# Patient Record
(demographics unavailable — no encounter records)

---

## 2018-05-10 NOTE — CR
EXAMINATION: Two-view chest (PA and Lateral views).

 

HISTORY: Cough.

 

FINDINGS: 

The trachea is midline. The cardiomediastinal silhouette is within normal limits. There is consolidat
ion within the right upper lobe. No pleural effusion or pneumothorax.

 

Osseous structures appear unremarkable.

 

IMPRESSION: 

Right upper lobe pneumonia.

## 2018-05-10 NOTE — EDM.PDOC
ED HPI GENERAL MEDICAL PROBLEM





- General


Chief Complaint: Respiratory Problem


Stated Complaint: PNEUMONIA


Time Seen by Provider: 05/10/18 12:38


Source of Information: Reports: Patient


History Limitations: Reports: No Limitations





- History of Present Illness


INITIAL COMMENTS - FREE TEXT/NARRATIVE: 


HISTORY AND PHYSICAL:





History of present illness:


Patient is a 54-year-old female who presents to the emergency room with 

concerns of cough and fever. She states that she has been taking Augmentin 

twice daily. She recently had a right sided bicep tendon repair by Dr Sandoval 

in [place]. She states that the Augmentin was prescribed for her symptoms and 

they wanted to keep her on this medication as they felt any other antibiotic 

would interrupt her healing.


States she has been using Tylenol to manage her fever of 101-102. Concerned as 

her fever "keeps coming back". 


Denies any wrist pain, shortness of breath, abdominal pain, nausea, vomiting or 

diarrhea/constipation. Denies any recent falls or injuries.





Review of systems: 


As per history of present illness and below otherwise all systems reviewed and 

negative.





Past medical history: 


As per history of present illness and as reviewed below otherwise 

noncontributory.





Surgical history: 


As per history of present illness and as reviewed below otherwise 

noncontributory.





Social history: 


No reported history of drug or alcohol abuse.





Family history: 


As per history of present illness and as reviewed below otherwise 

noncontributory.





Physical exam:


General: Well-developed and well-nourished 54-year-old female. Alert and 

oriented. Nontoxic appearing and in no acute distress.


HEENT: Atraumatic, normocephalic, pupils equal and reactive bilaterally, 

negative for conjunctival pallor or scleral icterus, mucous membranes moist, 

throat clear, neck supple, nontender, trachea midline. No drooling or trismus 

noted. No meningeal signs


Lungs: Clear to auscultation, breath sounds equal bilaterally, chest nontender.


Heart: S1S2, regular rate and rhythm without overt murmur


Abdomen: Soft, nondistended, nontender. Negative for masses or 

hepatosplenomegaly. Negative for costovertebral tenderness.


Pelvis: Stable nontender.


Genitourinary: Deferred.


Rectal: Deferred.


Skin: Intact, warm, dry. No lesions or rashes noted.


Extremities: Right arm in sling due to recent surgery (unable to assess ROM) 

strong radial pulses bilaterally, moves all other extremities without 

difficulty or deficits, negative for cords or calf pain. Neurovascular 

unremarkable.


Neuro: Awake, alert, oriented. Cranial nerves II through XII unremarkable. 

Cerebellum unremarkable. Motor and sensory unremarkable throughout. Exam 

nonfocal.





Notes:


I will get a CBC and a CMP as I want to make sure there is no other underlying 

reason for her fevers. 


Chest x-ray shows a right upper lobe pneumonia. Her oxygen saturation is above 

95% and she is currently afebrile. Has no elevation in her white count. This 

information was shared with the patient and she is comfortable being discharged 

to home. Patient continue her Augmentin. Z-Anthony has been added. Prescription for 

Ventolin inhaler with spacer. Phenergan with codiene for night time use (120ml, 

no refills). She voices understanding and is agreeable to plan of care. She 

denies any further questions at this time.





Diagnostics:


CBC, CMP, CXR





Therapeutics:


[]





Impression: 


Right upper lobe pneumonia





Plan:


1. Continue your Augmentin until medication is completed. Azithromycin has been 

added to your regiment.


2. Ventolin inhaler may be used for coughing, one to 2 puffs every 4 hours as 

needed. Use your spacer.


3. Continue with Tylenol and ibuprofen for fever management. Phenergan with 

Codiene for nighttime use; will cause drowsiness - so do not take while driving 

or needing to be functioning outside the house.


4. Follow-up with your primary caregiver in the next 1-2 days. Return to the ED 

as needed and as discussed.





Definitive disposition and diagnosis as appropriate pending reevaluation and 

review of above.








- Related Data


 Allergies











Allergy/AdvReac Type Severity Reaction Status Date / Time


 


morphine Allergy  Hives Verified 05/10/18 12:40











Home Meds: 


 Home Meds





Furosemide [Lasix] 20 mg PO DAILY 05/10/14 [History]


Levothyroxine 75 mcg PO ASDIRECTED 05/10/14 [History]











Past Medical History





- Past Health History


Medical/Surgical History: Denies Medical/Surgical History


HEENT History: Reports: Sinusitis


Other Cardiovascular History: retains water and heart valvue problems


Endocrine/Metabolic History: Reports: Hypothyroidism





Social & Family History





- Family History


Family Medical History: Noncontributory





- Caffeine Use


Caffeine Use: Reports: Tea





ED ROS GENERAL





- Review of Systems


Review Of Systems: ROS reveals no pertinent complaints other than HPI.





ED EXAM, GENERAL





- Physical Exam


Exam: See Below (See dictation)





Course





- Vital Signs


Last Recorded V/S: 


 Last Vital Signs











Temp  98.8 F   05/10/18 12:36


 


Pulse  90   05/10/18 12:36


 


Resp  16   05/10/18 12:36


 


BP  112/49 L  05/10/18 12:36


 


Pulse Ox  96   05/10/18 12:36














- Orders/Labs/Meds


Orders: 


 Active Orders 24 hr











 Category Date Time Status


 


 CMP [COMPREHENSIVE METABOLIC PN,CMP] [CHEM] Stat Lab  05/10/18 13:05 Received











Labs: 


 Laboratory Tests











  05/10/18 Range/Units





  13:05 


 


WBC  8.94  (4.0-11.0)  K/uL


 


RBC  4.22 L  (4.30-5.90)  M/uL


 


Hgb  12.3  (12.0-16.0)  g/dL


 


Hct  36.3  (36.0-46.0)  %


 


MCV  86.0  (80.0-98.0)  fL


 


MCH  29.1  (27.0-32.0)  pg


 


MCHC  33.9  (31.0-37.0)  g/dL


 


RDW Std Deviation  38.1  (28.0-62.0)  fl


 


RDW Coeff of Zafar  12  (11.0-15.0)  %


 


Plt Count  260  (150-400)  K/uL


 


MPV  9.70  (7.40-12.00)  fL


 


Neut % (Auto)  68.8  (48.0-80.0)  %


 


Lymph % (Auto)  16.7  (16.0-40.0)  %


 


Mono % (Auto)  10.0  (0.0-15.0)  %


 


Eos % (Auto)  3.7  (0.0-7.0)  %


 


Baso % (Auto)  0.8  (0.0-1.5)  %


 


Neut # (Auto)  6.2 H  (1.4-5.7)  K/uL


 


Lymph # (Auto)  1.5  (0.6-2.4)  K/uL


 


Mono # (Auto)  0.9 H  (0.0-0.8)  K/uL


 


Eos # (Auto)  0.3  (0.0-0.7)  K/uL


 


Baso # (Auto)  0.1  (0.0-0.1)  K/uL


 


Nucleated RBC %  0.0  /100WBC


 


Nucleated RBCs #  0  K/uL














Departure





- Departure


Time of Disposition: 13:53


Disposition: Home, Self-Care 01


Clinical Impression: 


Right upper lobe pneumonia


Qualifiers:


 Pneumonia type: due to unspecified organism Qualified Code(s): J18.1 - Lobar 

pneumonia, unspecified organism








- Discharge Information


Referrals: 


PCP,None [Primary Care Provider] - 


Forms:  ED Department Discharge


Additional Instructions: 


The following information is given to patients seen in the emergency department 

who are being discharged to home. This information is to outline your options 

for follow-up care. We provide all patients seen in our emergency department 

with a follow-up referral.





The need for follow-up, as well as the timing and circumstances, are variable 

depending upon the specifics of your emergency department visit.





If you don't have a primary care physician on staff, we will provide you with a 

referral. We always advise you to contact your personal physician following an 

emergency department visit to inform them of the circumstance of the visit and 

for follow-up with them and/or the need for any referrals to a consulting 

specialist.





The emergency department will also refer you to a specialist when appropriate. 

This referral assures that you have the opportunity for follow-up care with a 

specialist. All of these measure are taken in an effort to provide you with 

optimal care, which includes your follow-up.





Under all circumstances we always encourage you to contact your private 

physician who remains a resource for coordinating your care. When calling for 

follow-up care, please make the office aware that this follow-up is from your 

recent emergency room visit. If for any reason you are refused follow-up, 

please contact the [facility] Emergency 

Department at [phone] and asked to speak to the emergency department 

charge nurse.





[facility]


Primary Care


[address] [place] [id number]


Phone: [phone]


Fax: [phone]





1. Continue your Augmentin until medication is completed. Azithromycin has been 

added to your regiment.


2. Ventolin inhaler may be used for coughing, one to 2 puffs every 4 hours as 

needed. Use your spacer.


3. Continue with Tylenol and ibuprofen for fever management. Phenergan with 

Codiene for nighttime use; will cause drowsiness - so do not take while driving 

or needing to be functioning outside the house.


4. Follow-up with your primary caregiver in the next 1-2 days. Return to the ED 

as needed and as discussed.





- My Orders


Last 24 Hours: 


My Active Orders





05/10/18 13:05


CMP [COMPREHENSIVE METABOLIC PN,CMP] [CHEM] Stat 














- Assessment/Plan


Last 24 Hours: 


My Active Orders





05/10/18 13:05


CMP [COMPREHENSIVE METABOLIC PN,CMP] [CHEM] Stat

## 2019-02-11 NOTE — EDM.PDOC
ED HPI GENERAL MEDICAL PROBLEM





- General


Chief Complaint: General


Stated Complaint: ALLERGIC REACTION


Time Seen by Provider: 02/11/19 08:06


Source of Information: Reports: Patient


History Limitations: Reports: No Limitations





- History of Present Illness


INITIAL COMMENTS - FREE TEXT/NARRATIVE: 


History of present illness:


[]Patient has a history of diverticulitis and started Cipro 5 days ago and 

Flagyl 4 days ago for flareup. Right after she started the Flagyl she developed 

"hot spots" on her lower extremities patient has similar reaction when she 

started Cipro and Flagyl together last flareup so she was unsure of which 

antibiotic caused the symptoms. She intentionally started the antibiotics at 

different times so she could figure out what she was reacting to. Since lower 

leg spots are erythematous lesions to lower extremities nontender, non-itchy, 

but feel warm to the patient.





Review of systems: 


As per history of present illness and below otherwise all systems reviewed and 

negative.





Past medical history: 


As per history of present illness and as reviewed below otherwise 

noncontributory.





Surgical history: 


As per history of present illness and as reviewed below otherwise 

noncontributory.





Social history: 


No reported history of drug or alcohol abuse.





Family history: 


As per history of present illness and as reviewed below otherwise 

noncontributory.





Physical exam:


General: Well developed, well nourished in NAD


HEENT: Atraumatic, normocephalic, pupils reactive, negative for conjunctival 

pallor or scleral icterus, mucous membranes moist, throat clear, neck supple, 

nontender, trachea midline.


Lungs: Clear to auscultation, breath sounds equal bilaterally, chest nontender.


Heart: S1S2, regular, negative for clicks, rubs, or JVD.


Abdomen: NABS, Soft, nondistended, nontender. Negative for masses or 

hepatosplenomegaly. Negative for costovertebral tenderness.


Pelvis: Stable nontender.


Genitourinary: Deferred.


Rectal: Deferred.


Extremities: Atraumatic, approximately 6 cm x 6 cm lesions on her lower legs 

negative for cords or calf pain. Neurovascular unremarkable.


Neuro: Awake, alert, oriented. Cranial nerves II through XII unremarkable. 

Cerebellum unremarkable. Motor and sensory unremarkable throughout. Exam 

nonfocal.


Skin:warm and dry





Diagnostics:


None





Therapeutics:


none





ED Course:


unremarkable





Impression: 


erythema nodosum, oralpharyngeal candidiasis





Prescriptions:


diflucan





Plan:


Take meds instructed follow up with primary care return if symptoms worsen or 

change.





Definitive disposition and diagnosis as appropriate pending reevaluation and 

review of above.








- Related Data


 Allergies











Allergy/AdvReac Type Severity Reaction Status Date / Time


 


morphine Allergy  Hives Verified 02/11/19 08:14











Home Meds: 


 Home Meds





Furosemide [Lasix] 20 mg PO DAILY 05/10/14 [History]


Levothyroxine 75 mcg PO ASDIRECTED 05/10/14 [History]


Fluconazole [Diflucan] 100 mg PO DAILY #7 tablet 02/11/19 [Rx]











Past Medical History





- Past Health History


Medical/Surgical History: Denies Medical/Surgical History


HEENT History: Reports: Sinusitis


Other Cardiovascular History: retains water and heart valvue problems


Endocrine/Metabolic History: Reports: Hypothyroidism


Hematologic History: Reports: None


Immunologic History: Reports: None


Oncologic (Cancer) History: Reports: None





- Infectious Disease History


Infectious Disease History: Reports: None





- Past Surgical History


Head Surgeries/Procedures: Reports: None





Social & Family History





- Family History


Family Medical History: Noncontributory





- Tobacco Use


Smoking Status *Q: Never Smoker


Second Hand Smoke Exposure: No





- Caffeine Use


Caffeine Use: Reports: None





- Recreational Drug Use


Recreational Drug Use: No





ED ROS GENERAL





- Review of Systems


Review Of Systems: ROS reveals no pertinent complaints other than HPI.





ED EXAM, GENERAL





- Physical Exam


Exam: See Below (See history of present illness)





Course





- Vital Signs


Last Recorded V/S: 


 Last Vital Signs











Temp  96.6 F   02/11/19 08:13


 


Pulse  84   02/11/19 08:13


 


Resp  18   02/11/19 08:13


 


BP  119/76   02/11/19 08:13


 


Pulse Ox  98   02/11/19 08:13














Departure





- Departure


Time of Disposition: 08:43


Disposition: Home, Self-Care 01


Condition: Good


Clinical Impression: 


 Erythema nodosum, Oropharyngeal candidiasis








- Discharge Information


*PRESCRIPTION DRUG MONITORING PROGRAM REVIEWED*: No


*COPY OF PRESCRIPTION DRUG MONITORING REPORT IN PATIENT NEL: No


Prescriptions: 


Fluconazole [Diflucan] 100 mg PO DAILY #7 tablet


Referrals: 


Deb Dodd NP [Primary Care Provider] - 


Forms:  ED Department Discharge


Additional Instructions: 


The following information is given to patients seen in the emergency department 

who are being discharged to home. This information is to outline your options 

for follow-up care. We provide all patients seen in our emergency department 

with a follow-up referral.





The need for follow-up, as well as the timing and circumstances, are variable 

depending upon the specifics of your emergency department visit.





If you don't have a primary care physician on staff, we will provide you with a 

referral. We always advise you to contact your personal physician following an 

emergency department visit to inform them of the circumstance of the visit and 

for follow-up with them and/or the need for any referrals to a consulting 

specialist.





The emergency department will also refer you to a specialist when appropriate. 

This referral assures that you have the opportunity for follow-up care with a 

specialist. All of these measure are taken in an effort to provide you with 

optimal care, which includes your follow-up.





Under all circumstances we always encourage you to contact your private 

physician who remains a resource for coordinating your care. When calling for 

follow-up care, please make the office aware that this follow-up is from your 

recent emergency room visit. If for any reason you are refused follow-up, 

please contact the [facility] Emergency 

Department at [phone] and asked to speak to the emergency department 

charge nurse.





Stop antibiotics, take Diflucan as directed, follow up with primary care.





[facility]


Primary Care


[address] [place] [address]


Phone: [phone]


Fax: [phone]

## 2019-02-23 NOTE — EDM.PDOC
ED HPI GENERAL MEDICAL PROBLEM





- General


Chief Complaint: Respiratory Problem


Stated Complaint: LOST VOICE, COUGHING UP BLOOD


Time Seen by Provider: 02/23/19 10:37


Source of Information: Reports: Patient


History Limitations: Reports: No Limitations





- History of Present Illness


INITIAL COMMENTS - FREE TEXT/NARRATIVE: 





HISTORY AND PHYSICAL:





History of present illness:


Patient is a 55-year-old here with complaint of cough. She states she has a 

history of chronic bronchitis and developed a cough and hoarse voice this 

morning. She states she's really susceptible to pneumonia prompting her to come 

to the ED immediately. She does have a ventolin inhaler but has not used this 

yet. She denies fevers, chills, nausea, vomiting, diarrhea, chest pain, SOB. 





Review of systems: 


As per history of present illness and below otherwise all systems reviewed and 

negative.





Past medical history: 


As per history of present illness and as reviewed below otherwise 

noncontributory.





Surgical history: 


As per history of present illness and as reviewed below otherwise 

noncontributory.





Social history: 


No reported history of drug or alcohol abuse.





Family history: 


As per history of present illness and as reviewed below otherwise 

noncontributory.





Physical exam:


General: Patient sitting comfortably in no acute distress and nontoxic appearing


HEENT: Atraumatic, normocephalic, pupils reactive, negative for conjunctival 

pallor or scleral icterus, mucous membranes moist, throat clear, neck supple, 

nontender, trachea midline. No meningeal signs. 


Lungs: Clear to auscultation, breath sounds equal bilaterally, chest nontender.


Heart: S1S2, regular, negative for clicks, rubs, or overt murmur.


Abdomen: Soft, nondistended, nontender. Negative for masses or 

hepatosplenomegaly. Negative for costovertebral tenderness.


Pelvis: Stable nontender.


Genitourinary: Deferred.


Rectal: Deferred.


Extremities: Atraumatic, negative for cords or calf pain. Neurovascular 

unremarkable.


Neuro: Awake, alert, oriented. Cranial nerves II through XII unremarkable. 

Cerebellum unremarkable. Motor and sensory unremarkable throughout. Exam 

nonfocal.





Notes: 





Diagnostics:


CBC, CMP, CXR, influenza 





Therapeutics:


None





Prescriptions:


Ventolin inhaler


Azithromycin


Mental dosepak





Impression: 


Acute bronchitis





Plan:


1. Take medications as instructed.


2. Follow up with primary care provider


3. Return to ED as needed as discussed





Definitive disposition and diagnosis as appropriate pending reevaluation and 

review of above.








- Related Data


 Allergies











Allergy/AdvReac Type Severity Reaction Status Date / Time


 


metronidazole [From Flagyl] Allergy  Rash Verified 02/23/19 10:35


 


morphine Allergy  Hives Verified 02/23/19 10:34











Home Meds: 


 Home Meds





Furosemide [Lasix] 20 mg PO DAILY 05/10/14 [History]


Levothyroxine 75 mcg PO ASDIRECTED 05/10/14 [History]


Albuterol [Ventolin HFA] 1 puff INH Q4H #1 inhaler 02/23/19 [Rx]


Azithromycin [Zithromax] 250 mg PO ASDIRECTED #1 dosepk 02/23/19 [Rx]


methylPREDNISolone [Medrol] 4 mg PO ASDIRECTED #1 tab.ds.pk 02/23/19 [Rx]











Past Medical History





- Past Health History


Medical/Surgical History: Denies Medical/Surgical History


HEENT History: Reports: Sinusitis


Other Cardiovascular History: retains water and heart valvue problems


Endocrine/Metabolic History: Reports: Hypothyroidism


Hematologic History: Reports: None


Immunologic History: Reports: None


Oncologic (Cancer) History: Reports: None





- Infectious Disease History


Infectious Disease History: Reports: None





- Past Surgical History


Head Surgeries/Procedures: Reports: None





Social & Family History





- Family History


Family Medical History: Noncontributory





- Caffeine Use


Caffeine Use: Reports: None





ED ROS GENERAL





- Review of Systems


Review Of Systems: ROS reveals no pertinent complaints other than HPI.





ED EXAM, GENERAL





- Physical Exam


Exam: See Below (see dictation)





Course





- Vital Signs


Last Recorded V/S: 


 Last Vital Signs











Temp  99.7 F   02/23/19 10:35


 


Pulse  92   02/23/19 10:35


 


Resp  20   02/23/19 10:35


 


BP  107/67   02/23/19 10:35


 


Pulse Ox  100   02/23/19 10:35














- Orders/Labs/Meds


Labs: 


 Laboratory Tests











  02/23/19 02/23/19 Range/Units





  11:04 11:04 


 


WBC  12.06 H   (4.0-11.0)  K/uL


 


RBC  4.43   (4.30-5.90)  M/uL


 


Hgb  13.2   (12.0-16.0)  g/dL


 


Hct  38.3   (36.0-46.0)  %


 


MCV  86.5   (80.0-98.0)  fL


 


MCH  29.8   (27.0-32.0)  pg


 


MCHC  34.5   (31.0-37.0)  g/dL


 


RDW Std Deviation  39.2   (28.0-62.0)  fl


 


RDW Coeff of Zafar  12   (11.0-15.0)  %


 


Plt Count  188   (150-400)  K/uL


 


MPV  10.30   (7.40-12.00)  fL


 


Neut % (Auto)  80.8 H   (48.0-80.0)  %


 


Lymph % (Auto)  9.8 L   (16.0-40.0)  %


 


Mono % (Auto)  6.8   (0.0-15.0)  %


 


Eos % (Auto)  2.1   (0.0-7.0)  %


 


Baso % (Auto)  0.5   (0.0-1.5)  %


 


Neut # (Auto)  9.8 H   (1.4-5.7)  K/uL


 


Lymph # (Auto)  1.2   (0.6-2.4)  K/uL


 


Mono # (Auto)  0.8   (0.0-0.8)  K/uL


 


Eos # (Auto)  0.3   (0.0-0.7)  K/uL


 


Baso # (Auto)  0.1   (0.0-0.1)  K/uL


 


Nucleated RBC %  0.0   /100WBC


 


Nucleated RBCs #  0   K/uL


 


Sodium   136  (136-145)  mmol/L


 


Potassium   3.7  (3.5-5.1)  mmol/L


 


Chloride   100  ()  mmol/L


 


Carbon Dioxide   29.6  (21.0-32.0)  mmol/L


 


BUN   10  (7.0-18.0)  mg/dL


 


Creatinine   0.7  (0.6-1.0)  mg/dL


 


Est Cr Clr Drug Dosing   75.12  mL/min


 


Estimated GFR (MDRD)   > 60.0  ml/min


 


Glucose   100  ()  mg/dL


 


Calcium   8.8  (8.5-10.1)  mg/dL


 


Total Bilirubin   0.3  (0.2-1.0)  mg/dL


 


AST   23  (15-37)  IU/L


 


ALT   34  (14-63)  IU/L


 


Alkaline Phosphatase   86  ()  U/L


 


Total Protein   7.4  (6.4-8.2)  g/dL


 


Albumin   3.6  (3.4-5.0)  g/dL


 


Globulin   3.8  (2.6-4.0)  g/dL


 


Albumin/Globulin Ratio   0.9  (0.9-1.6)  














Departure





- Departure


Time of Disposition: 11:43


Disposition: Home, Self-Care 01


Condition: Good


Clinical Impression: 


 Acute bronchitis








- Discharge Information


Prescriptions: 


Albuterol [Ventolin HFA] 1 puff INH Q4H #1 inhaler


Azithromycin [Zithromax] 250 mg PO ASDIRECTED #1 dosepk


methylPREDNISolone [Medrol] 4 mg PO ASDIRECTED #1 tab.ds.pk


Referrals: 


Deb Dodd NP [Primary Care Provider] - 


Forms:  ED Department Discharge


Additional Instructions: 


The following information is given to patients seen in the emergency department 

who are being discharged to home. This information is to outline your options 

for follow-up care. We provide all patients seen in our emergency department 

with a follow-up referral.





The need for follow-up, as well as the timing and circumstances, are variable 

depending upon the specifics of your emergency department visit.





If you don't have a primary care physician on staff, we will provide you with a 

referral. We always advise you to contact your personal physician following an 

emergency department visit to inform them of the circumstance of the visit and 

for follow-up with them and/or the need for any referrals to a consulting 

specialist.





The emergency department will also refer you to a specialist when appropriate. 

This referral assures that you have the opportunity for follow-up care with a 

specialist. All of these measure are taken in an effort to provide you with 

optimal care, which includes your follow-up.





Under all circumstances we always encourage you to contact your private 

physician who remains a resource for coordinating your care. When calling for 

follow-up care, please make the office aware that this follow-up is from your 

recent emergency room visit. If for any reason you are refused follow-up, 

please contact the [facility] Emergency 

Department at [phone] and asked to speak to the emergency department 

charge nurse.





1. Take medications as instructed.


2. Follow up with primary care provider


3. Return to ED as needed as discussed

## 2019-02-23 NOTE — CR
Indication:



Cough. Loss of voice. Shortness of breath. Pain. 



Technique:



PA and lateral views the chest were obtained.



Comparison:



None



Findings:



The heart is normal in size. The lungs are clear. No infiltrate, pleural 

effusion, or pneumothorax is identified.



Impression:



No acute cardiopulmonary process.



Dictated by Gabbi Patel MD @ Feb 23 2019 11:33AM



Signed by Dr. Gabbi Patel @ Feb 23 2019 11:34AM

## 2019-08-04 NOTE — CR
Indication:



Fall, pain 



Technique:



Two views left femur



Comparison:



None



Findings:



Bones: Alignment is normal. No fractures or bone lesions.  



Joint spaces: Unremarkable.  



Soft tissues: Unremarkable.  



Impression:



Negative.



Dictated by Sarah Patterson MD @ Aug  4 2019  9:11PM



Signed by Dr. Sarah Patterson @ Aug  4 2019  9:11PM

## 2019-08-04 NOTE — EDM.PDOC
ED HPI GENERAL MEDICAL PROBLEM





- General


Chief Complaint: Lower Extremity Injury/Pain


Stated Complaint: DISLOCATED HIP


Time Seen by Provider: 08/04/19 20:37


Source of Information: Reports: Patient


History Limitations: Reports: No Limitations





- History of Present Illness


INITIAL COMMENTS - FREE TEXT/NARRATIVE: 


HISTORY AND PHYSICAL:





History of present illness:


Patient is a 55-year-old female who presents to the emergency room today with 

complaints of hip pain post fall. She states she was getting out of a boat and 

reaching over to get off onto the dock when she fell onto her left hip. She 

denies hitting her head or having any loss of consciousness. Denies any other 

extremity involvement. Patient denies any fever, chills, headache, change in 

vision, syncope or near syncope. Denies any chest pain, back pain, shortness of 

breath or cough. Denies any GI or  symptoms.





Review of systems: 


As per history of present illness and below otherwise all systems reviewed and 

negative.





Past medical history: 


As per history of present illness and as reviewed below otherwise 

noncontributory.





Surgical history: 


As per history of present illness and as reviewed below otherwise 

noncontributory.





Social history: 


See social history for further information





Family history: 


As per history of present illness and as reviewed below otherwise 

noncontributory.





Physical exam:


General: Well-developed and well-nourished 55-year-old female. Alert and 

oriented. Nontoxic appearing and in no acute distress.


HEENT: Atraumatic, normocephalic, pupils equal and reactive bilaterally, 

negative for conjunctival pallor or scleral icterus, mucous membranes moist, 

trachea midline. No drooling or trismus noted. No meningeal signs. No hot 

potato voice noted. 


Lungs: Clear to auscultation, breath sounds equal bilaterally, chest nontender.


Heart: S1S2, regular rate and rhythm without overt murmur


Abdomen: Soft, nondistended, nontender. Negative for masses. Negative for 

costovertebral tenderness. Pelvis is stable, left hip pain with palpation.


Skin: Early bruising noted to the left lateral hip. Otherwise skin is intact, 

warm, dry. No lesions or rashes noted.


C-spine/Back: No pinpoint vertebral tenderness upon palpation. No crepitus, step

-offs or obvious deformities. She is able to lift her toes up towards her nose 

and pushed down with equal and strong force bilaterally. Denies any urinary or 

fecal incontinence. Denies any numbness, tingling or saddle paresthesia. 


Extremities: Left hip pain to palpation over the lateral hip into the glue. No 

leg shortening or rotation noted. Otherwise moves all other extremities per 

self without difficulty or deficits, negative for cords or calf pain. Strong 

pedal pulses bilaterally. Neurovascular unremarkable.


Neuro: Awake, alert, oriented. Cranial nerves II through XII unremarkable. 

Cerebellum unremarkable. Motor and sensory unremarkable throughout. Exam 

nonfocal.





Notes:


X-ray shows no acute findings. We discussed the need for follow-up with the 

orthopedic provider. She was fitted for crutches and supportive care measures 

were reviewed and discussed. Voices understanding and is agreeable to plan of 

care. Denies any further questions or concerns at this time.





Diagnostics:


X-ray





Therapeutics:


Crutches





Prescription:


Norco





Impression: 


Left hip injury





Plan:


1. Rest, ice, elevate the affected extremity. Please be nonweight bearing and 

use the crutches as directed.


2. Tylenol and/or Ibuprofen as needed for pain management. 


3. Follow up with the Orthopedic provider as we discussed. Return to the ED as 

needed and as discussed.





Definitive disposition and diagnosis as appropriate pending reevaluation and 

review of above.





  ** Left Hip


Pain Score (Numeric/FACES): 10





- Related Data


 Allergies











Allergy/AdvReac Type Severity Reaction Status Date / Time


 


metronidazole [From Flagyl] Allergy  Rash Verified 08/04/19 20:25


 


morphine Allergy  Hives Verified 08/04/19 20:25











Home Meds: 


 Home Meds





Furosemide [Lasix] 20 mg PO DAILY 05/10/14 [History]


Levothyroxine 75 mcg PO ASDIRECTED 05/10/14 [History]











Past Medical History





- Past Health History


Medical/Surgical History: Denies Medical/Surgical History


HEENT History: Reports: Sinusitis


Other Cardiovascular History: retains water and heart valvue problems


Respiratory History: Reports: Bronchitis, Recurrent


Endocrine/Metabolic History: Reports: Hypothyroidism


Hematologic History: Reports: None


Immunologic History: Reports: None


Oncologic (Cancer) History: Reports: None





- Infectious Disease History


Infectious Disease History: Reports: Chicken Pox





- Past Surgical History


Head Surgeries/Procedures: Reports: None


Female  Surgical History: Reports: Hysterectomy


Musculoskeletal Surgical History: Reports: Shoulder Surgery





Social & Family History





- Family History


Family Medical History: Noncontributory





- Tobacco Use


Smoking Status *Q: Never Smoker





- Caffeine Use


Caffeine Use: Reports: Coffee, Tea





- Recreational Drug Use


Recreational Drug Use: No





Review of Systems





- Review of Systems


Review Of Systems: ROS reveals no pertinent complaints other than HPI.





ED EXAM, GENERAL





- Physical Exam


Exam: See Below (See dictation)





Course





- Vital Signs


Last Recorded V/S: 


 Last Vital Signs











Temp  97.4 F   08/04/19 20:26


 


Pulse  65   08/04/19 20:26


 


Resp  20   08/04/19 20:26


 


BP  134/60   08/04/19 20:26


 


Pulse Ox  98   08/04/19 20:26














- Orders/Labs/Meds


Orders: 


 Active Orders 24 hr











 Category Date Time Status


 


 Femur Min 2V Lt [CR] Stat Exams  08/04/19 20:20 Ordered


 


 Pelvis 1V or 2V [CR] Stat Exams  08/04/19 20:32 Ordered











Meds: 


Medications














Discontinued Medications














Generic Name Dose Route Start Last Admin





  Trade Name Freq  PRN Reason Stop Dose Admin


 


Hydrocodone Bitart/Acetaminophen  1 tab  08/04/19 20:52  





  Norco 325-5 Mg  PO  08/04/19 20:53  





  ONETIME ONE   





     





     





     





     














Departure





- Departure


Time of Disposition: 20:58


Disposition: Home, Self-Care 01


Clinical Impression: 


Injury of left hip


Qualifiers:


 Encounter type: initial encounter Qualified Code(s): S79.912A - Unspecified 

injury of left hip, initial encounter








- Discharge Information


Instructions:  Hip Pain


Referrals: 


PCP,None [Primary Care Provider] - 


Forms:  ED Department Discharge


Additional Instructions: 


The following information is given to patients seen in the emergency department 

who are being discharged to home. This information is to outline your options 

for follow-up care. We provide all patients seen in our emergency department 

with a follow-up referral.





The need for follow-up, as well as the timing and circumstances, are variable 

depending upon the specifics of your emergency department visit.





If you don't have a primary care physician on staff, we will provide you with a 

referral. We always advise you to contact your personal physician following an 

emergency department visit to inform them of the circumstance of the visit and 

for follow-up with them and/or the need for any referrals to a consulting 

specialist.





The emergency department will also refer you to a specialist when appropriate. 

This referral assures that you have the opportunity for follow-up care with a 

specialist. All of these measure are taken in an effort to provide you with 

optimal care, which includes your follow-up.





Under all circumstances we always encourage you to contact your private 

physician who remains a resource for coordinating your care. When calling for 

follow-up care, please make the office aware that this follow-up is from your 

recent emergency room visit. If for any reason you are refused follow-up, 

please contact the [facility] Emergency 

Department at [phone] and asked to speak to the emergency department 

charge nurse.





[facility]


Primary Care


[address] [place] [address]


Phone: [phone]


Fax: [phone]





[address] [address]


Phone: [phone]


Fax: [phone]





1. Rest, ice, elevate the affected extremity. Please be nonweight bearing and 

use the crutches as directed.


2. Tylenol and/or Ibuprofen as needed for pain management. 


3. Follow up with the Orthopedic provider as we discussed. Return to the ED as 

needed and as discussed.

## 2019-08-04 NOTE — CR
Indication:



Fall, pain 



Technique:



Frontal view pelvis



Comparison:



None



Findings:



Bones: Alignment is normal. No fractures or bone lesions.  



Joint spaces: Unremarkable.  



Soft tissues: Unremarkable.  



Impression:



Negative.



Dictated by Sarah Patterson MD @ Aug  4 2019  9:12PM



Signed by Dr. Sarah Patterson @ Aug  4 2019  9:12PM

## 2021-11-12 NOTE — EDM.PDOC
ED HPI GENERAL MEDICAL PROBLEM





- General


Chief Complaint: Fever


Stated Complaint: COVID SYMPTOMS


Time Seen by Provider: 11/12/21 11:52


Source of Information: Reports: Patient


History Limitations: Reports: No Limitations





- History of Present Illness


INITIAL COMMENTS - FREE TEXT/NARRATIVE: 





HISTORY AND PHYSICAL:





History of present illness:


The patient is a 88-year-old female who presents to the emergency department 

with complaints of an intermittent course cough, mid back pain, and general 

malaise that started yesterday.  The patient normally gets a chronic bronchitis 

around this time a year and thought maybe it could be that, but did obtain a 

home Covid test and after 2 attempts was unsure whether it was positive or not. 

She spoke with a physician that attends her [employer] who told her she needed to 

find out if she had Covid or not as she would need the monoclonal antibody 

infusion due to her leaky cardiac valve.  The patient states that she has been 

running a fever with T-max at home of 103.  She has been taking Tylenol for 

this.  Patient denies any chills, headache, change in vision, syncope or near 

syncope. Denies any shortness of breath. Denies any abdominal pain, nausea, 

vomiting, diarrhea, constipation or dysuria. Has not noted any blood in urine or

stool. Patient has been eating and drinking appropriately.





In the emergency department the patient is hemodynamically stable with a blood 

pressure of 115/64 and a pulse of 96.  The patient is afebrile with a temp of 

98.5.  The patient is in no respiratory distress with a respiratory rate of 18 

and an SPO2 of 96% on room air.





Review of systems: 


As per history of present illness and below otherwise all systems reviewed and 

negative.





Past medical history: 


As per history of present illness and as reviewed below otherwise 

noncontributory.





Surgical history: 


As per history of present illness and as reviewed below otherwise 

noncontributory.





Social history: 


See social history for further information





Family history: 


As per history of present illness and as reviewed below otherwise 

noncontributory.





Physical exam:


General: Well developed and well nourished. Alert and orientated x 3. Nontoxic i

n appearance and in no acute distress. Vital signs are stable and have been 

reviewed by me. Nursing notes were reviewed. 


HEENT: Atraumatic, normocephalic, pupils equal and reactive bilaterally, negat

christian for conjunctival pallor or scleral icterus, mucous membranes moist, TMs 

normal bilaterally, throat clear, neck supple, nontender, trachea midline. No 

drooling or trismus noted. No meningeal signs. No hot potato voice noted. 


Lungs: Clear to auscultation bilaterally. No wheezes, rales, or rhonchi.   Chest

nontender. Normal work of breathing, no accessory muscles used.


Heart: S1S2, regular rate and rhythm without overt murmur, gallops, or rubs. No 

JVD. No peripheral edema


Abdomen: Soft, nondistended, nontender. Normoactive bowel sounds. Negative for 

masses or costovertebral tenderness.


Skin: Intact, warm, dry. No lesions or rashes noted.


Hematologic: No petechiae or purpra. Mucosa appropriate color and normal nail 

bed color and refill.


Extremities: Atraumatic, moves all extremities per self without difficulty or 

deficits, negative for cords or calf pain. Neurovascular unremarkable.


Neuro: Awake, alert, oriented. Cranial nerves II through XII unremarkable. 

Cerebellum unremarkable. Motor and sensory unremarkable throughout. Exam 

nonfocal.


Psychiatric: Mood and affect are appropriate.  Normal thought process. Answering

questions appropriately.





Notes:


*This patient was seen and evaluated during the 2020 SARS-CoV-2 novel 

coronavirus pandemic period.  Community viral transmission is ongoing at time of

this encounter and the emergency department is operating under pandemic response

procedures.





As stated above the patient is a 58-year-old female who presented to the 

emergency department with complaints of an intermittent cough, middle lobe back 

pain, a burning chest when she coughs, and general malaise that started 

yesterday.  The patient thought that maybe this was her chronic bronchitis 

acting up but also thought this could be COVID-19.  The patient is unvaccinated.

 Patient took 2 home test which were inconclusive.  Apparently the patient has a

relationship with a physician that attends [employer] with her and he wanted her to 

get tested in case she needed the monoclonal antibodies for a leaky heart valve 

situation.  The patient's exam is benign.  The patient has not coughed during 

her time here but states that her cough is intermittent.  The patient is 

afebrile here but stated that she had a max temp of 103 at home for which she 

took Tylenol.  I will order a Covid/flu swab, chest x-ray, CBC, CMP and BNP.  

The patient is agreeable with this plan.





Dr. Rosario interpreted the EKG as sinus rhythm rate of 98 somewhat low voltage 

in the precordial leads.  T wave inversion V1 through V3 was some flattening in 

V4 through V6 in the right clinical context could represent ischemia but no 

STEMI.  Could also be a normal variant.  I discussed these findings with Dr. Rosario and informed and the patient does complain of chest burning when she is 

coughing.  She has no chest pain cardiac harding per see.  Dr. Castro stated this 

is not concerning for a cardiac event.  I still have ordered a troponin.  





Chest x-ray IMPRESSION: Unremarkable chest. The patient's CBC is unremarkable.  

The patient's CMP is remarkable for a slightly elevated glucose at 108 and 

elevated AST at 63 and ALT at 78.  The patient's troponin is less than 0.050.  

The patient's urinalysis is negative.  The patient's influenza swab is negative.

 The patient's COVID-19 swab is positive. The BNP is 19. 





I informed the patient about her positive COVID-19 swab and the need to isolate 

and treat her symptoms.  We discussed allowing the fever to happen unless she 

requires Tylenol or Motrin for pain or discomfort.  I also informed the patient 

that she needed to obtain a portable oxygen saturation and monitor her oxygen 

saturation.  Informed the patient that if it drops below 90% she needs to return

to the emergency department.  Also informed the patient if she were to have blue

lips or bluish fingertips please return to the emergency department.  The 

patient states that she has an oxygen saturation monitor at home and it normally

runs between 96 and 100%.  I have given the patient the fact sheet on the 

monoclonal antibodies and she is agreeable with the infusion.  I have completed 

the paperwork and it is being faxed to the infusion clinic.  The patient is 

aware that she will be receiving a phone call for the infusion.





I have talked with the patient about today's findings, in addition to providing 

specific details for plan of care.  Reassessment at the time of disposition 

demonstrates that the patient is in no acute distress.  The patient is stable 

for discharge, counseling was provided and we discussed in great detail signs 

and symptoms that would prompt them to return to the Emergency Department. 

Medication, follow up and supportive care measures were reviewed and discussed. 

Voices understanding and is agreeable to plan of care. Denies any further 

questions or concerns at this time.





Diagnostics:Covid/flu swab, chest x-ray, CBC, CMP, BNP, Troponin





Therapeutics: Toradol 60 mg IM





Prescription: Monoclonal antibodies





Impression: COVID-19





Plan:


1.a  Your COVID-19 screening is positive.  That means you do have the 

coronavirus and you are considered contagious.  Your vital signs and oxygen 

saturation are well enough that you were able to monitor your symptoms at home. 

Continue to monitor for trouble breathing,  new confusion or inability to 

arouse, bluish lips or face or any of the other symptoms we discussed -if this 

occurs please return to the emergency room.  You should obtain a portable oxygen

saturation monitor and monitor your oxygen.  If your oxygen level drops below 

90% on room air you need to return to the emergency department.  As we discussed

if you can tolerate the fever allow your body to have the fever as this is 

fighting the virus.  However if you need to take Tylenol or Motrin for your pain

please do so.


1.b the infusion clinic will be calling you for AN appointment for the 

monoclonal antibodies.  Be sure to answer your phone.


2.  Please self quarantine until cleared by [facility] Department.  Inform any

persons that you have been in contact with since you started becoming 

symptomatic that you have tested positive; they should be made aware and take 

the appropriate steps as needed.


3. You can take NyQuil during the evening to help get a restful night sleep. May

alternate Tylenol and ibuprofen as needed for pain and fever management.


4. The [facility] department will be calling you and following up with you. 

The [place] COVID 19 Hotline phone number [phone], They are open Monday - 

Friday 7am - 7pm. Follow up with your primary care provider for re-evaluation 

and re-testing after the 10 day quarantine and discuss when you should be seen.








Definitive disposition and diagnosis as appropriate pending reevaluation and 

review of above.





  ** back


Pain Score (Numeric/FACES): 5





- Related Data


                                    Allergies











Allergy/AdvReac Type Severity Reaction Status Date / Time


 


metronidazole [From Flagyl] Allergy  Rash Verified 11/12/21 11:50


 


morphine Allergy  Hives Verified 11/12/21 11:50











Home Meds: 


                                    Home Meds





Furosemide [Lasix] 20 mg PO DAILY 05/10/14 [History]


Levothyroxine 75 mcg PO ASDIRECTED 05/10/14 [History]











Past Medical History





- Past Health History


Medical/Surgical History: Denies Medical/Surgical History


HEENT History: Reports: Sinusitis


Other Cardiovascular History: retains water and heart valvue problems


Respiratory History: Reports: Bronchitis, Recurrent


Endocrine/Metabolic History: Reports: Hypothyroidism


Hematologic History: Reports: None


Immunologic History: Reports: None


Oncologic (Cancer) History: Reports: None





- Infectious Disease History


Infectious Disease History: Reports: Chicken Pox





- Past Surgical History


Head Surgeries/Procedures: Reports: None


Female  Surgical History: Reports: Hysterectomy


Musculoskeletal Surgical History: Reports: Shoulder Surgery





Social & Family History





- Family History


Family Medical History: No Pertinent Family History





- Tobacco Use


Tobacco Use Status *Q: Never Tobacco User


Second Hand Smoke Exposure: No





- Caffeine Use


Caffeine Use: Reports: Tea





- Recreational Drug Use


Recreational Drug Use: No





ED ROS GENERAL





- Review of Systems


Review Of Systems: Comprehensive ROS is negative, except as noted in HPI.





ED EXAM, GENERAL





- Physical Exam


Exam: See Below (See dictation)





Course





- Vital Signs


Last Recorded V/S: 


                                Last Vital Signs











Temp  98.5 F   11/12/21 11:50


 


Pulse  77   11/12/21 14:12


 


Resp  17   11/12/21 14:12


 


BP  89/49 L  11/12/21 14:12


 


Pulse Ox  96   11/12/21 14:12














- Orders/Labs/Meds


Labs: 


                                Laboratory Tests











  11/12/21 11/12/21 11/12/21 Range/Units





  12:29 12:46 13:00 


 


WBC    4.81  (4.0-11.0)  K/uL


 


RBC    4.43  (4.30-5.90)  M/uL


 


Hgb    13.7  (12.0-16.0)  g/dL


 


Hct    40.4  (36.0-46.0)  %


 


MCV    91.2  (80.0-98.0)  fL


 


MCH    30.9  (27.0-32.0)  pg


 


MCHC    33.9  (31.0-37.0)  g/dL


 


RDW Std Deviation    44.7  (28.0-62.0)  fl


 


RDW Coeff of Zafar    14  (11.0-15.0)  %


 


Plt Count    191  (150-400)  K/uL


 


MPV    10.50  (7.40-12.00)  fL


 


Neut % (Auto)    76.7  (48.0-80.0)  %


 


Lymph % (Auto)    10.2 L  (16.0-40.0)  %


 


Mono % (Auto)    9.6  (0.0-15.0)  %


 


Eos % (Auto)    2.3  (0.0-7.0)  %


 


Baso % (Auto)    1.2  (0.0-1.5)  %


 


Neut # (Auto)    3.7  (1.4-5.7)  K/uL


 


Lymph # (Auto)    0.5 L  (0.6-2.4)  K/uL


 


Mono # (Auto)    0.5  (0.0-0.8)  K/uL


 


Eos # (Auto)    0.1  (0.0-0.7)  K/uL


 


Baso # (Auto)    0.1  (0.0-0.1)  K/uL


 


Nucleated RBC %    0.0  /100WBC


 


Nucleated RBCs #    0  K/uL


 


Sodium     (136-145)  mmol/L


 


Potassium     (3.5-5.1)  mmol/L


 


Chloride     ()  mmol/L


 


Carbon Dioxide     (21.0-32.0)  mmol/L


 


BUN     (7.0-18.0)  mg/dL


 


Creatinine     (0.6-1.0)  mg/dL


 


Est Cr Clr Drug Dosing     mL/min


 


Estimated GFR (MDRD)     ml/min


 


Glucose     ()  mg/dL


 


Calcium     (8.5-10.1)  mg/dL


 


Total Bilirubin     (0.2-1.0)  mg/dL


 


AST     (15-37)  IU/L


 


ALT     (14-63)  IU/L


 


Alkaline Phosphatase     ()  U/L


 


Troponin I     (0.000-0.056)  ng/mL


 


B-Natriuretic Peptide     (<100)  PG/ML


 


Total Protein     (6.4-8.2)  g/dL


 


Albumin     (3.4-5.0)  g/dL


 


Globulin     (2.6-4.0)  g/dL


 


Albumin/Globulin Ratio     (0.9-1.6)  


 


Urine Color   YELLOW   


 


Urine Appearance   CLEAR   


 


Urine pH   6.5   (5.0-8.0)  


 


Ur Specific Gravity   1.010   (1.001-1.035)  


 


Urine Protein   NEGATIVE   (NEGATIVE)  mg/dL


 


Urine Glucose (UA)   NEGATIVE   (NEGATIVE)  mg/dL


 


Urine Ketones   NEGATIVE   (NEGATIVE)  mg/dL


 


Urine Occult Blood   NEGATIVE   (NEGATIVE)  


 


Urine Nitrite   NEGATIVE   (NEGATIVE)  


 


Urine Bilirubin   NEGATIVE   (NEGATIVE)  


 


Urine Urobilinogen   0.2   (<2.0)  EU/dL


 


Ur Leukocyte Esterase   NEGATIVE   (NEGATIVE)  


 


Influenza Type A RNA  NEGATIVE    (NEGATIVE)  


 


Influenza Type B RNA  NEGATIVE    (NEGATIVE)  


 


SARS-CoV-2 RNA (JASON)  POSITIVE H    (NEGATIVE)  














  11/12/21 11/12/21 11/12/21 Range/Units





  13:00 13:00 13:00 


 


WBC     (4.0-11.0)  K/uL


 


RBC     (4.30-5.90)  M/uL


 


Hgb     (12.0-16.0)  g/dL


 


Hct     (36.0-46.0)  %


 


MCV     (80.0-98.0)  fL


 


MCH     (27.0-32.0)  pg


 


MCHC     (31.0-37.0)  g/dL


 


RDW Std Deviation     (28.0-62.0)  fl


 


RDW Coeff of Zafar     (11.0-15.0)  %


 


Plt Count     (150-400)  K/uL


 


MPV     (7.40-12.00)  fL


 


Neut % (Auto)     (48.0-80.0)  %


 


Lymph % (Auto)     (16.0-40.0)  %


 


Mono % (Auto)     (0.0-15.0)  %


 


Eos % (Auto)     (0.0-7.0)  %


 


Baso % (Auto)     (0.0-1.5)  %


 


Neut # (Auto)     (1.4-5.7)  K/uL


 


Lymph # (Auto)     (0.6-2.4)  K/uL


 


Mono # (Auto)     (0.0-0.8)  K/uL


 


Eos # (Auto)     (0.0-0.7)  K/uL


 


Baso # (Auto)     (0.0-0.1)  K/uL


 


Nucleated RBC %     /100WBC


 


Nucleated RBCs #     K/uL


 


Sodium  138    (136-145)  mmol/L


 


Potassium  3.5    (3.5-5.1)  mmol/L


 


Chloride  100    ()  mmol/L


 


Carbon Dioxide  29.1    (21.0-32.0)  mmol/L


 


BUN  13    (7.0-18.0)  mg/dL


 


Creatinine  0.9    (0.6-1.0)  mg/dL


 


Est Cr Clr Drug Dosing  56.36    mL/min


 


Estimated GFR (MDRD)  > 60.0    ml/min


 


Glucose  108 H    ()  mg/dL


 


Calcium  9.3    (8.5-10.1)  mg/dL


 


Total Bilirubin  0.4    (0.2-1.0)  mg/dL


 


AST  63 H    (15-37)  IU/L


 


ALT  78 H    (14-63)  IU/L


 


Alkaline Phosphatase  93    ()  U/L


 


Troponin I    < 0.050  (0.000-0.056)  ng/mL


 


B-Natriuretic Peptide   19   (<100)  PG/ML


 


Total Protein  7.9    (6.4-8.2)  g/dL


 


Albumin  3.8    (3.4-5.0)  g/dL


 


Globulin  4.1 H    (2.6-4.0)  g/dL


 


Albumin/Globulin Ratio  0.9    (0.9-1.6)  


 


Urine Color     


 


Urine Appearance     


 


Urine pH     (5.0-8.0)  


 


Ur Specific Gravity     (1.001-1.035)  


 


Urine Protein     (NEGATIVE)  mg/dL


 


Urine Glucose (UA)     (NEGATIVE)  mg/dL


 


Urine Ketones     (NEGATIVE)  mg/dL


 


Urine Occult Blood     (NEGATIVE)  


 


Urine Nitrite     (NEGATIVE)  


 


Urine Bilirubin     (NEGATIVE)  


 


Urine Urobilinogen     (<2.0)  EU/dL


 


Ur Leukocyte Esterase     (NEGATIVE)  


 


Influenza Type A RNA     (NEGATIVE)  


 


Influenza Type B RNA     (NEGATIVE)  


 


SARS-CoV-2 RNA (JASON)     (NEGATIVE)  











Meds: 


Medications














Discontinued Medications














Generic Name Dose Route Start Last Admin





  Trade Name Freq  PRN Reason Stop Dose Admin


 


Ketorolac Tromethamine  60 mg  11/12/21 12:20  11/12/21 12:39





  Ketorolac 60 Mg/2 Ml Sdv  IM  11/12/21 12:21  60 mg





  ONETIME ONE   Administration














Departure





- Departure


Time of Disposition: 14:11


Disposition: Home, Self-Care 01


Condition: Good


Clinical Impression: 


 COVID-19








- Discharge Information


*PRESCRIPTION DRUG MONITORING PROGRAM REVIEWED*: Not Applicable


*COPY OF PRESCRIPTION DRUG MONITORING REPORT IN PATIENT NEL: Not Applicable


Instructions:  COVID-19 Vaccine Information, COVID-19: What to Do If You Are 

Sick- [facility] (03/17/2021)


Referrals: 


Jatinder Jerez MD [Primary Care Provider] - 


Forms:  ED Department Discharge


Additional Instructions: 


The following information is given to patients seen in the emergency department 

who are being discharged to home. This information is to outline your options 

for follow-up care. We provide all patients seen in our emergency department 

with a follow-up referral.





The need for follow-up, as well as the timing and circumstances, are variable 

depending upon the specifics of your emergency department visit.





If you don't have a primary care physician on staff, we will provide you with a 

referral. We always advise you to contact your personal physician following an 

emergency department visit to inform them of the circumstance of the visit and 

for follow-up with them and/or the need for any referrals to a consulting 

specialist.





The emergency department will also refer you to a specialist when appropriate. 

This referral assures that you have the opportunity for follow-up care with a 

specialist. All of these measure are taken in an effort to provide you with 

optimal care, which includes your follow-up.





Under all circumstances we always encourage you to contact your private 

physician who remains a resource for coordinating your care. When calling for 

follow-up care, please make the office aware that this follow-up is from your 

recent emergency room visit. If for any reason you are refused follow-up, please

contact the [facility] Emergency Department

at [phone] and asked to speak to the emergency department charge nurse.





[facility] - Primary [facility]


[address] [place] [address]


Phone: [phone]


Fax: [phone]





[address] [address]


Phone: [phone]


Fax: [phone]





Plan:


1.a  Your COVID-19 screening is positive.  That means you do have the 

coronavirus and you are considered contagious.  Your vital signs and oxygen 

saturation are well enough that you were able to monitor your symptoms at home. 

Continue to monitor for trouble breathing,  new confusion or inability to 

arouse, bluish lips or face or any of the other symptoms we discussed -if this 

occurs please return to the emergency room.  You should obtain a portable oxygen

saturation monitor and monitor your oxygen.  If your oxygen level drops below 

90% on room air you need to return to the emergency department.  As we discussed

if you can tolerate the fever allow your body to have the fever as this is 

fighting the virus.  However if you need to take Tylenol or Motrin for your pain

please do so.


1.b the infusion clinic will be calling you for AN appointment for the 

monoclonal antibodies.  Be sure to answer your phone.


2.  Please self quarantine until cleared by [facility] Department.  Inform any

persons that you have been in contact with since you started becoming 

symptomatic that you have tested positive; they should be made aware and take 

the appropriate steps as needed.


3. You can take NyQuil during the evening to help get a restful night sleep. May

alternate Tylenol and ibuprofen as needed for pain and fever management.


4. The [facility] department will be calling you and following up with you. 

The [place] COVID 19 Hotline phone number [phone], They are open Monday - 

Friday 7am - 7pm. Follow up with your primary care provider for re-evaluation 

and re-testing after the 10 day quarantine and discuss when you should be seen.





Sepsis Event Note (ED)





- Evaluation


Sepsis Screening Result: No Definite Risk





- Focused Exam


Vital Signs: 


                                   Vital Signs











  Temp Pulse Resp BP Pulse Ox


 


 11/12/21 14:12   77  17  89/49 L  96


 


 11/12/21 11:50  98.5 F  96  18  115/64

## 2021-11-12 NOTE — PCM.EKG
** #1 Interpretation


EKG Date: 11/12/21


Time: 12:34


EKG Interpretation Comments: 


Sinus rhythm rate of 90 somewhat low voltage in the precordial leads.  T wave 

inversions V1 through V3 with some flattening in V4 through V6 in the right 

clinical context could represent ischemia but no STEMI.  Could also be normal 

variant.

## 2021-11-12 NOTE — CR
INDICATION: Cough



TECHNIQUE:



Chest 2 views.



COMPARISON:



2/23/19



FINDINGS:



Cardiovascular and mediastinum:  Heart size and vasculature are normal in 

caliber and appearance.  Mediastinum is within normal limits.  



Lungs and pleural spaces:  Lungs are clear.  No sign of infiltrate or mass. 

 No sign of pleural effusion.  No pneumothorax.  



Bones and soft tissues:  No significant findings.  



IMPRESSION:



Unremarkable chest.



Dictated by: Bunny Mckoy MD @ 11/12/2021 12:31:47



(Electronically Signed)